# Patient Record
Sex: FEMALE | Race: WHITE | HISPANIC OR LATINO | URBAN - METROPOLITAN AREA
[De-identification: names, ages, dates, MRNs, and addresses within clinical notes are randomized per-mention and may not be internally consistent; named-entity substitution may affect disease eponyms.]

---

## 2020-07-20 ENCOUNTER — OFFICE VISIT (OUTPATIENT)
Dept: URBAN - METROPOLITAN AREA CLINIC 78 | Facility: CLINIC | Age: 45
End: 2020-07-20
Payer: COMMERCIAL

## 2020-07-20 DIAGNOSIS — K62.89 PROCTALGIA: ICD-10-CM

## 2020-07-20 DIAGNOSIS — R12 HEARTBURN: ICD-10-CM

## 2020-07-20 DIAGNOSIS — R19.8 ANAL DISCHARGE: ICD-10-CM

## 2020-07-20 DIAGNOSIS — R14.0 BLOATING: ICD-10-CM

## 2020-07-20 PROCEDURE — 99204 OFFICE O/P NEW MOD 45 MIN: CPT | Performed by: INTERNAL MEDICINE

## 2020-07-20 PROCEDURE — G9903 PT SCRN TBCO ID AS NON USER: HCPCS | Performed by: INTERNAL MEDICINE

## 2020-07-20 PROCEDURE — G8420 CALC BMI NORM PARAMETERS: HCPCS | Performed by: INTERNAL MEDICINE

## 2020-07-20 PROCEDURE — G8427 DOCREV CUR MEDS BY ELIG CLIN: HCPCS | Performed by: INTERNAL MEDICINE

## 2020-07-20 RX ORDER — SODIUM, POTASSIUM,MAG SULFATES 17.5-3.13G
354 ML SOLUTION, RECONSTITUTED, ORAL ORAL
Qty: 1 | Refills: 0 | OUTPATIENT
Start: 2020-07-20

## 2020-07-20 NOTE — HPI-OTHER HISTORIES
She states that since she had her daughter in 2015 she developed severe constipation. Since then, she feels that she has some mild fecal discharge where she has to wipe fairly constantly to feel clean. She has also had some sharp rectal pain (described as a sudden ice pick stabbing sensation that resolves on its own within a ew minutes) occurring intermittently (~ twice a year). She has not had any rectal bleeding or anal itching. She continues to have some mild instances of constipation.   She has also noted a feeling of bloating and gasiness. She has not noticed any particular food triggers, although wonders if milk could be doing it as she does consume this every day. She denies nausea or vomitng. Appetite has been good. She has been cutting down on carbs lately.  She has occasional heartburn, which is mild. She does not feel that she has to take any specific meds for this, but has noted it particularly when eating tomato-based sauces. She denies dysphagia.   She has never had EGD or colonoscopy. She has never been tested for celiac sprue or H pylori. There is no FH of GI malignancies or colon polyps; no FH of IBD.   She is originally from [place].

## 2020-07-23 ENCOUNTER — WEB ENCOUNTER (OUTPATIENT)
Dept: URBAN - METROPOLITAN AREA CLINIC 78 | Facility: CLINIC | Age: 45
End: 2020-07-23

## 2020-08-19 ENCOUNTER — CLAIMS CREATED FROM THE CLAIM WINDOW (OUTPATIENT)
Dept: URBAN - METROPOLITAN AREA CLINIC 4 | Facility: CLINIC | Age: 45
End: 2020-08-19
Payer: COMMERCIAL

## 2020-08-19 ENCOUNTER — OFFICE VISIT (OUTPATIENT)
Dept: URBAN - METROPOLITAN AREA SURGERY CENTER 15 | Facility: SURGERY CENTER | Age: 45
End: 2020-08-19
Payer: COMMERCIAL

## 2020-08-19 DIAGNOSIS — K62.89 ANAL BURNING: ICD-10-CM

## 2020-08-19 DIAGNOSIS — R14.0 ABDOMINAL BLOATING: ICD-10-CM

## 2020-08-19 DIAGNOSIS — K31.89 OTHER DISEASES OF STOMACH AND DUODENUM: ICD-10-CM

## 2020-08-19 PROCEDURE — 45378 DIAGNOSTIC COLONOSCOPY: CPT | Performed by: INTERNAL MEDICINE

## 2020-08-19 PROCEDURE — G8907 PT DOC NO EVENTS ON DISCHARG: HCPCS | Performed by: INTERNAL MEDICINE

## 2020-08-19 PROCEDURE — 88305 TISSUE EXAM BY PATHOLOGIST: CPT | Performed by: PATHOLOGY

## 2020-08-19 PROCEDURE — 43239 EGD BIOPSY SINGLE/MULTIPLE: CPT | Performed by: INTERNAL MEDICINE

## 2020-08-19 PROCEDURE — G9937 DIG OR SURV COLSCO: HCPCS | Performed by: INTERNAL MEDICINE

## 2023-08-23 ENCOUNTER — CLAIMS CREATED FROM THE CLAIM WINDOW (OUTPATIENT)
Dept: URBAN - METROPOLITAN AREA TELEHEALTH 2 | Facility: TELEHEALTH | Age: 48
End: 2023-08-23
Payer: COMMERCIAL

## 2023-08-23 ENCOUNTER — TELEPHONE ENCOUNTER (OUTPATIENT)
Dept: URBAN - METROPOLITAN AREA CLINIC 78 | Facility: CLINIC | Age: 48
End: 2023-08-23

## 2023-08-23 DIAGNOSIS — R14.0 BLOATING: ICD-10-CM

## 2023-08-23 DIAGNOSIS — K62.89 PROCTALGIA: ICD-10-CM

## 2023-08-23 DIAGNOSIS — R12 HEARTBURN: ICD-10-CM

## 2023-08-23 DIAGNOSIS — R19.8 ANAL DISCHARGE: ICD-10-CM

## 2023-08-23 DIAGNOSIS — K31.89 OTHER DISEASES OF STOMACH AND DUODENUM: ICD-10-CM

## 2023-08-23 PROCEDURE — 99214 OFFICE O/P EST MOD 30 MIN: CPT | Performed by: INTERNAL MEDICINE

## 2023-08-23 RX ORDER — SODIUM, POTASSIUM,MAG SULFATES 17.5-3.13G
354 ML SOLUTION, RECONSTITUTED, ORAL ORAL
Qty: 1 | Refills: 0 | Status: ACTIVE | COMMUNITY
Start: 2020-07-20

## 2023-08-23 NOTE — HPI-OTHER HISTORIES
She states that since she had her daughter in 2015 she developed severe constipation. Since then, she feels that she has some mild fecal discharge where she has to wipe fairly constantly to feel clean. She has also had some sharp rectal pain (described as a sudden ice pick stabbing sensation that resolves on its own within a ew minutes) occurring intermittently (~ twice a year). She has not had any rectal bleeding or anal itching. She continues to have some mild instances of constipation.   She states she is having similar issues as previously. No further heartburn or bloating. She hs been avoiding lactose and feels that this has helped significantly.    She has also noted a feeling of bloating and gasiness. She has not noticed any particular food triggers, although wonders if milk could be doing it as she does consume this every day. She denies nausea or vomitng. Appetite has been good. She has been cutting down on carbs lately.  She has occasional heartburn, which is mild. She does not feel that she has to take any specific meds for this, but has noted it particularly when eating tomato-based sauces. She denies dysphagia.   She has never had EGD or colonoscopy. Testing for celiac sprue or H pylori has been negative.. There is no FH of GI malignancies or colon polyps; no FH of IBD.   She is originally from [place].  Summary of prior workup: - EGD/colonoscopy by me on 8/19/2020: Normal upper GI tract including a normal ampulla. Biopsies were negative for celiac sprue or H pylori. Normal colon to the TI except for IHs. A repeat colonoscopy was advised in 10 years.

## 2023-08-28 ENCOUNTER — OFFICE VISIT (OUTPATIENT)
Dept: URBAN - METROPOLITAN AREA CLINIC 78 | Facility: CLINIC | Age: 48
End: 2023-08-28

## 2023-08-28 ENCOUNTER — OFFICE VISIT (OUTPATIENT)
Dept: URBAN - METROPOLITAN AREA TELEHEALTH 2 | Facility: TELEHEALTH | Age: 48
End: 2023-08-28

## 2023-12-04 ENCOUNTER — DASHBOARD ENCOUNTERS (OUTPATIENT)
Age: 48
End: 2023-12-04

## 2023-12-04 ENCOUNTER — OFFICE VISIT (OUTPATIENT)
Dept: URBAN - METROPOLITAN AREA CLINIC 78 | Facility: CLINIC | Age: 48
End: 2023-12-04
Payer: COMMERCIAL

## 2023-12-04 VITALS
BODY MASS INDEX: 23.16 KG/M2 | WEIGHT: 139 LBS | SYSTOLIC BLOOD PRESSURE: 109 MMHG | DIASTOLIC BLOOD PRESSURE: 72 MMHG | HEART RATE: 80 BPM | TEMPERATURE: 98.2 F | HEIGHT: 65 IN | RESPIRATION RATE: 15 BRPM

## 2023-12-04 DIAGNOSIS — K31.89 OTHER DISEASES OF STOMACH AND DUODENUM: ICD-10-CM

## 2023-12-04 DIAGNOSIS — R19.8 ANAL DISCHARGE: ICD-10-CM

## 2023-12-04 DIAGNOSIS — K62.89 PROCTALGIA: ICD-10-CM

## 2023-12-04 DIAGNOSIS — K59.01 CONSTIPATION: ICD-10-CM

## 2023-12-04 DIAGNOSIS — R14.0 BLOATING: ICD-10-CM

## 2023-12-04 DIAGNOSIS — K59.09 CHRONIC CONSTIPATION: ICD-10-CM

## 2023-12-04 DIAGNOSIS — R12 HEARTBURN: ICD-10-CM

## 2023-12-04 PROCEDURE — 99214 OFFICE O/P EST MOD 30 MIN: CPT | Performed by: INTERNAL MEDICINE

## 2023-12-04 RX ORDER — SODIUM, POTASSIUM,MAG SULFATES 17.5-3.13G
354 ML SOLUTION, RECONSTITUTED, ORAL ORAL
Qty: 1 | Refills: 0 | Status: ON HOLD | COMMUNITY
Start: 2020-07-20

## 2023-12-04 NOTE — HPI-OTHER HISTORIES
She states that since she had her daughter in 2015 she developed severe constipation. Since then, she feels that she has some mild fecal discharge where she has to wipe fairly constantly to feel clean. She has also had some sharp rectal pain (described as a sudden ice pick stabbing sensation that resolves on its own within a ew minutes) occurring intermittently (~ twice a year). She has not had any rectal bleeding or anal itching. She continues to have some mild instances of constipation.   She has been watching her diet and trying to eat healthier. She had been using both the suppositories and NTG ointment but noticed a leakage which was foul smelling (not the smell of stool, but rather the smell of  the ointment). She has hence not been using it lately.   She states she is having similar issues as previously. No further heartburn. She has been avoiding lactose and feels that this has helped significantly.    She does admit to constipation. Her stools have been small, hard pellets and she has noted  feeling of incomplete evacuation. She is not on any bowel regimen.   She has also noted a feeling of bloating and gasiness. She has not noticed any particular food triggers, although wonders if milk could be doing it as she does consume this every day. She denies nausea or vomitng. Appetite has been good. She has been cutting down on carbs lately.  She does not feel that she has to take any specific meds for this, but has noted it particularly when eating tomato-based sauces. She denies dysphagia.   Testing for celiac sprue or H pylori has been negative. There is no FH of GI malignancies or colon polyps; no FH of IBD.   She is originally from [place].  Summary of prior workup: - EGD/colonoscopy by me on 8/19/2020: Normal upper GI tract including a normal ampulla. Biopsies were negative for celiac sprue or H pylori. Normal colon to the TI except for IHs. A repeat colonoscopy was advised in 10 years.

## 2023-12-08 ENCOUNTER — CLAIMS CREATED FROM THE CLAIM WINDOW (OUTPATIENT)
Dept: URBAN - METROPOLITAN AREA SURGERY CENTER 15 | Facility: SURGERY CENTER | Age: 48
End: 2023-12-08
Payer: COMMERCIAL

## 2023-12-08 ENCOUNTER — CLAIMS CREATED FROM THE CLAIM WINDOW (OUTPATIENT)
Dept: URBAN - METROPOLITAN AREA CLINIC 4 | Facility: CLINIC | Age: 48
End: 2023-12-08
Payer: COMMERCIAL

## 2023-12-08 DIAGNOSIS — K62.89 OTHER SPECIFIED DISEASES OF ANUS AND RECTUM: ICD-10-CM

## 2023-12-08 DIAGNOSIS — K64.0 GRADE I HEMORRHOIDS: ICD-10-CM

## 2023-12-08 DIAGNOSIS — K62.89 PROCTALGIA: ICD-10-CM

## 2023-12-08 DIAGNOSIS — R19.8 ANAL DISCHARGE: ICD-10-CM

## 2023-12-08 DIAGNOSIS — K64.0 GRADE I INTERNAL HEMORRHOIDS: ICD-10-CM

## 2023-12-08 DIAGNOSIS — K63.89 OTHER SPECIFIED DISEASES OF INTESTINE: ICD-10-CM

## 2023-12-08 PROCEDURE — G8907 PT DOC NO EVENTS ON DISCHARG: HCPCS | Performed by: INTERNAL MEDICINE

## 2023-12-08 PROCEDURE — 00811 ANES LWR INTST NDSC NOS: CPT | Performed by: NURSE ANESTHETIST, CERTIFIED REGISTERED

## 2023-12-08 PROCEDURE — 88305 TISSUE EXAM BY PATHOLOGIST: CPT | Performed by: PATHOLOGY

## 2023-12-08 PROCEDURE — 45331 SIGMOIDOSCOPY AND BIOPSY: CPT | Performed by: INTERNAL MEDICINE

## 2023-12-08 RX ORDER — SODIUM, POTASSIUM,MAG SULFATES 17.5-3.13G
354 ML SOLUTION, RECONSTITUTED, ORAL ORAL
Qty: 1 | Refills: 0 | Status: ON HOLD | COMMUNITY
Start: 2020-07-20